# Patient Record
Sex: MALE | Race: BLACK OR AFRICAN AMERICAN | Employment: FULL TIME | URBAN - METROPOLITAN AREA
[De-identification: names, ages, dates, MRNs, and addresses within clinical notes are randomized per-mention and may not be internally consistent; named-entity substitution may affect disease eponyms.]

---

## 2023-11-12 ENCOUNTER — HOSPITAL ENCOUNTER (EMERGENCY)
Facility: HOSPITAL | Age: 33
Discharge: HOME | End: 2023-11-12
Attending: EMERGENCY MEDICINE
Payer: COMMERCIAL

## 2023-11-12 VITALS
BODY MASS INDEX: 29.09 KG/M2 | DIASTOLIC BLOOD PRESSURE: 97 MMHG | SYSTOLIC BLOOD PRESSURE: 144 MMHG | RESPIRATION RATE: 18 BRPM | OXYGEN SATURATION: 97 % | HEIGHT: 74 IN | WEIGHT: 226.63 LBS | HEART RATE: 63 BPM

## 2023-11-12 DIAGNOSIS — R31.9 HEMATURIA, UNSPECIFIED TYPE: Primary | ICD-10-CM

## 2023-11-12 LAB
APPEARANCE UR: CLEAR
BACTERIA URNS QL MICRO: NEGATIVE /HPF
BILIRUB UR QL: NEGATIVE
COLOR UR: ABNORMAL
EPITH CASTS URNS QL MICRO: ABNORMAL /LPF
GLUCOSE UR STRIP.AUTO-MCNC: NEGATIVE MG/DL
HGB UR QL STRIP: ABNORMAL
HYALINE CASTS URNS QL MICRO: ABNORMAL /LPF (ref 0–2)
KETONES UR QL STRIP.AUTO: NEGATIVE MG/DL
LEUKOCYTE ESTERASE UR QL STRIP.AUTO: NEGATIVE
NITRITE UR QL STRIP.AUTO: NEGATIVE
PH UR STRIP: 5.5 (ref 5–8)
PROT UR STRIP-MCNC: ABNORMAL MG/DL
RBC #/AREA URNS HPF: >100 /HPF (ref 0–5)
SP GR UR REFRACTOMETRY: 1.02
URINE CULTURE IF INDICATED: ABNORMAL
UROBILINOGEN UR QL STRIP.AUTO: 1 EU/DL (ref 0.2–1)
WBC URNS QL MICRO: ABNORMAL /HPF (ref 0–4)

## 2023-11-12 PROCEDURE — 99283 EMERGENCY DEPT VISIT LOW MDM: CPT

## 2023-11-12 PROCEDURE — 81001 URINALYSIS AUTO W/SCOPE: CPT

## 2023-11-12 RX ORDER — TAMSULOSIN HYDROCHLORIDE 0.4 MG/1
0.4 CAPSULE ORAL DAILY
Qty: 90 CAPSULE | Refills: 1 | Status: SHIPPED | OUTPATIENT
Start: 2023-11-12

## 2023-11-12 RX ORDER — KETOROLAC TROMETHAMINE 10 MG/1
10 TABLET, FILM COATED ORAL EVERY 6 HOURS PRN
Qty: 20 TABLET | Refills: 0 | Status: SHIPPED | OUTPATIENT
Start: 2023-11-12

## 2023-11-12 ASSESSMENT — LIFESTYLE VARIABLES
HOW OFTEN DO YOU HAVE A DRINK CONTAINING ALCOHOL: 2-4 TIMES A MONTH
HOW MANY STANDARD DRINKS CONTAINING ALCOHOL DO YOU HAVE ON A TYPICAL DAY: 1 OR 2

## 2023-11-12 ASSESSMENT — PAIN - FUNCTIONAL ASSESSMENT: PAIN_FUNCTIONAL_ASSESSMENT: 0-10

## 2023-11-12 ASSESSMENT — PAIN SCALES - GENERAL: PAINLEVEL_OUTOF10: 5

## 2023-11-12 NOTE — ED NOTES
Patient discharged from the ED by [facility] MD. Diagnosis, medications, precautions and follow-ups were reviewed with the patient/family. Questions were asked and answered prior to departure. Patient departed the ED via ambulation and was accompanied by self.      Chantel Rodriguez RN  11/12/23 [id number]

## 2023-11-12 NOTE — DISCHARGE INSTRUCTIONS
You were seen in the emergency department complaining of resolved flank pain and scant blood in your urine. You are offered abdominal CT which she declined as you are not currently having any pain or any other symptoms. No concern for STIs at this time. Your urine did not demonstrate any evidence of infection, no UTI. Likely source of bleeding is kidney stone given symptoms. Will provide you with pain medication upon discharge as well as medication to potentially help you pass other stones that you may have. Please follow-up with [phone]) [phone] regarding further treatment of your ongoing symptoms.

## 2023-11-12 NOTE — ED PROVIDER NOTES
[facility] EMERGENCY DEPT  EMERGENCY DEPARTMENT ENCOUNTER       Pt Name: Derrick Nova  MRN: [id number]  [address] [facility] [place] 1990  Date of evaluation: 11/12/2023  Provider: Cheryl Payan MD   PCP: No primary care provider on file. Note Started: 4:05 AM EST 11/12/23     CHIEF COMPLAINT       Chief Complaint   Patient presents with    Back Pain     Patient arrives ambulatory to triage with complaint of lower back pain on the right side and noticing blood in his urine with clots around 30 minutes ago. Patient also reports some urinary frequency as well. HISTORY OF PRESENT ILLNESS: 1 or more elements      History From: patient, History limited by: none     Derrick Nova is a [address] y.o. male with no PMHx who presents to the ED for hematuria that occurred approximately 30 min PTA. Patient reports that he had 3/10 right-sided flank pain and 1 episode of hematuria that have since resolved. Patient states that he is currently symptom-free, denied dysuria, urinary frequency, penile discharge, or concern for STIs. No penile lesions or lacerations. No prior trauma to penis. No prior history of kidney stones, no fever, chills, abdominal pain, nausea, or vomiting. On arrival, urine in the room is light yellow in appearance. Please See MDM for Additional Details of the HPI/PMH  Nursing Notes were all reviewed and agreed with or any disagreements were addressed in the HPI. REVIEW OF SYSTEMS        Positives and Pertinent negatives as per HPI. PAST HISTORY     Past Medical History:  No past medical history on file. Past Surgical History:  No past surgical history on file. Family History:  No family history on file. Social History:        Allergies:  No Known Allergies    CURRENT MEDICATIONS      Discharge Medication List as of 11/12/2023  3:45 AM          SCREENINGS               No data recorded         PHYSICAL EXAM      ED Triage Vitals [11/12/23 0251]   Enc Vitals Group      BP (!) 144/97